# Patient Record
Sex: FEMALE | Race: WHITE | Employment: UNEMPLOYED | URBAN - METROPOLITAN AREA
[De-identification: names, ages, dates, MRNs, and addresses within clinical notes are randomized per-mention and may not be internally consistent; named-entity substitution may affect disease eponyms.]

---

## 2020-02-03 ENCOUNTER — HOSPITAL ENCOUNTER (EMERGENCY)
Age: 30
Discharge: HOME | End: 2020-02-03
Attending: EMERGENCY MEDICINE
Payer: COMMERCIAL

## 2020-02-03 ENCOUNTER — APPOINTMENT (OUTPATIENT)
Dept: CT IMAGING | Age: 30
End: 2020-02-03
Payer: COMMERCIAL

## 2020-02-03 VITALS
TEMPERATURE: 97 F | OXYGEN SATURATION: 100 % | HEART RATE: 77 BPM | SYSTOLIC BLOOD PRESSURE: 110 MMHG | DIASTOLIC BLOOD PRESSURE: 71 MMHG | RESPIRATION RATE: 18 BRPM

## 2020-02-03 PROCEDURE — 6360000002 HC RX W HCPCS: Performed by: EMERGENCY MEDICINE

## 2020-02-03 PROCEDURE — 96372 THER/PROPH/DIAG INJ SC/IM: CPT

## 2020-02-03 PROCEDURE — 99284 EMERGENCY DEPT VISIT MOD MDM: CPT

## 2020-02-03 RX ORDER — DIPHENHYDRAMINE HYDROCHLORIDE 50 MG/ML
12.5 INJECTION INTRAMUSCULAR; INTRAVENOUS ONCE
Status: COMPLETED | OUTPATIENT
Start: 2020-02-03 | End: 2020-02-03

## 2020-02-03 RX ORDER — PROCHLORPERAZINE EDISYLATE 5 MG/ML
10 INJECTION INTRAMUSCULAR; INTRAVENOUS ONCE
Status: COMPLETED | OUTPATIENT
Start: 2020-02-03 | End: 2020-02-03

## 2020-02-03 RX ADMIN — DIPHENHYDRAMINE HYDROCHLORIDE 12.5 MG: 50 INJECTION, SOLUTION INTRAMUSCULAR; INTRAVENOUS at 02:16

## 2020-02-03 RX ADMIN — PROCHLORPERAZINE EDISYLATE 10 MG: 5 INJECTION INTRAMUSCULAR; INTRAVENOUS at 02:16

## 2020-02-03 ASSESSMENT — PAIN DESCRIPTION - ORIENTATION: ORIENTATION: RIGHT;LEFT;POSTERIOR

## 2020-02-03 ASSESSMENT — ENCOUNTER SYMPTOMS
SHORTNESS OF BREATH: 0
ABDOMINAL PAIN: 0
BACK PAIN: 0
COUGH: 0
NAUSEA: 1
VOMITING: 1

## 2020-02-03 ASSESSMENT — PAIN DESCRIPTION - LOCATION: LOCATION: HEAD

## 2020-02-03 ASSESSMENT — PAIN DESCRIPTION - DESCRIPTORS: DESCRIPTORS: HEADACHE

## 2020-02-03 ASSESSMENT — PAIN SCALES - GENERAL: PAINLEVEL_OUTOF10: 9

## 2020-02-03 ASSESSMENT — PAIN DESCRIPTION - FREQUENCY: FREQUENCY: CONTINUOUS

## 2020-02-03 ASSESSMENT — PAIN DESCRIPTION - PAIN TYPE: TYPE: ACUTE PAIN

## 2020-02-03 NOTE — ED PROVIDER NOTES
[facility]     Emergency Department     Faculty Attestation    I performed a history and physical examination of the patient and discussed management with the resident. I have reviewed and agree with the residents findings including all diagnostic interpretations, and treatment plans as written. Any areas of disagreement are noted on the chart. I was personally present for the key portions of any procedures. I have documented in the chart those procedures where I was not present during the key portions. I have reviewed the emergency nurses triage note. I agree with the chief complaint, past medical history, past surgical history, allergies, medications, social and family history as documented unless otherwise noted below. Documentation of the HPI, Physical Exam and Medical Decision Making performed by [email or web address] is based on my personal performance of the HPI, PE and MDM. For Physician Assistant/ Nurse Practitioner cases/documentation I have personally evaluated this patient and have completed at least one if not all key elements of the E/M (history, physical exam, and MDM). Additional findings are as noted. Patient with history of MS. Occasional headaches in the past.  But states she woke up suddenly prior to coming with a 10 out of 10 headache. That woke her up from sleep. She was nauseous with vomiting. States she has never had a headache like this in the past.    Patient has received a migraine cocktail prior to my evaluation and headache has resolved at this time and she is requesting to be discharged. I did speak with patient at length regarding her concerning symptoms for her headache and concern for possible subarachnoid hemorrhage versus ruptured aneurysm. And recommend CT head. Patient states that she understands the concern. But declines further work-up she is of capable decision-making capacity. She is accompanied by her mother.   There is no family history of aneurysms. She would like to be discharged, and knows she can return at any time for further evaluation. She is aware of the risk for possible bleed and and that this could potentially lead to death.     Mg Goldsmith D.O, M.P.H  Attending Emergency Medicine Physician         Mg Goldsmith,   02/03/20 [id number]

## 2020-02-03 NOTE — ED PROVIDER NOTES
[facility]  ED  Emergency Department Encounter  EmergencyMedicine Resident     Pt Name:Yisel Warner  MRN: [id number]  [place] 1990  Date of evaluation: 2/3/20  PCP:  No primary care provider on file. CHIEF COMPLAINT       Chief Complaint   Patient presents with    Migraine     posterior scalp to bilateral temporal areas         HISTORY OF PRESENT ILLNESS  (Location/Symptom, Timing/Onset, Context/Setting, Quality, Duration,Modifying Factors, Severity.)      Alyson Potts is a 34 y.o. female who presents with migraine. Patient states that she woke up earlier this evening with a headache. States that this happened about an hour prior to arrival.  States his headache is worse than her normal migraines. She does not take migraine medication. She tried 800 mg of Motrin which did not improve her headache. Nothing seems to make her headache worse. Associate with photophobia and neck pain nausea and vomiting. Patient also has a past medical history of MS. She is not taking any medications for this. Denies any blurred vision double vision. Headache was of rapid onset. Time of maximal intensity was brief. States this is different from her normal migraines. Denies any trauma, no blood thinners, no IV drug use. Severity is moderate duration is constant context is headache          PAST MEDICAL / SURGICAL / SOCIAL / FAMILY HISTORY      has no past medical history on file. has no past surgical history on file.     Social History     Socioeconomic History    Marital status:      Spouse name: Not on file    Number of children: Not on file    Years of education: Not on file    Highest education level: Not on file   Occupational History    Not on file   Social Needs    Financial resource strain: Not on file    Food insecurity:     Worry: Not on file     Inability: Not on file    Transportation needs:     Medical: Not on file     Non-medical: Not on file   Tobacco Use    Head: Normocephalic and atraumatic. Eyes:      Pupils: Pupils are equal, round, and reactive to light. Neck:      Vascular: No JVD. Trachea: No tracheal deviation. Cardiovascular:      Rate and Rhythm: Normal rate and regular rhythm. Pulmonary:      Effort: Pulmonary effort is normal.      Breath sounds: Normal breath sounds. Skin:     General: Skin is warm and dry. Findings: No rash. Neurological:      Mental Status: She is alert and oriented to person, place, and time. Comments: Cranial nerves II through XII grossly intact bilaterally. Muscle strength 5 out of 5 in upper and lower extremities bilaterally . Sensation intact to face and extremities, cerebellar testing normal including normal finger-nose-finger and heel-to-shin. DTRs 2+. Gait steady without assistance. DIFFERENTIAL  DIAGNOSIS     PLAN (LABS / IMAGING / EKG):  Orders Placed This Encounter   Procedures    CBC Auto Differential    HCG Qualitative, Serum    Protime-INR    APTT    Basic Metabolic Panel       MEDICATIONS ORDERED:  Orders Placed This Encounter   Medications    prochlorperazine (COMPAZINE) injection 10 mg    diphenhydrAMINE (BENADRYL) injection 12.5 mg         SCORES  DIAGNOSTIC RESULTS / EMERGENCY DEPARTMENT COURSE / MDM     LABS:  No results found for this visit on 02/03/20. IMPRESSION: This is a 59-year-old female presenting with migraine with multiple red flag symptoms. On my exam patient has stable vital signs she is neuro intact. No nuchal rigidity but she does have some tenderness at the base of the skull. Impression is headache, rule out subarachnoid hemorrhage. Plan is CT head, basic labs, migraine cocktail. We will proceed with lumbar puncture if CT head negative. Will reevaluate.     RADIOLOGY:  No orders to display         EKG  None    All EKG's are interpreted by the [facility] Physician who either signs or Co-signs this chart in the absence of a

## 2020-05-19 ENCOUNTER — HOSPITAL ENCOUNTER (EMERGENCY)
Age: 30
Discharge: HOME | End: 2020-05-19
Attending: EMERGENCY MEDICINE
Payer: COMMERCIAL

## 2020-05-19 VITALS
HEART RATE: 88 BPM | TEMPERATURE: 98.6 F | RESPIRATION RATE: 18 BRPM | HEIGHT: 66 IN | OXYGEN SATURATION: 99 % | WEIGHT: 110 LBS | DIASTOLIC BLOOD PRESSURE: 77 MMHG | BODY MASS INDEX: 17.68 KG/M2 | SYSTOLIC BLOOD PRESSURE: 111 MMHG

## 2020-05-19 LAB
ABSOLUTE EOS #: 0.07 K/UL (ref 0–0.44)
ABSOLUTE IMMATURE GRANULOCYTE: <0.03 K/UL (ref 0–0.3)
ABSOLUTE LYMPH #: 2.87 K/UL (ref 1.1–3.7)
ABSOLUTE MONO #: 0.57 K/UL (ref 0.1–1.2)
ALBUMIN SERPL-MCNC: 4 G/DL (ref 3.5–5.2)
ALBUMIN/GLOBULIN RATIO: 1.4 (ref 1–2.5)
ALP BLD-CCNC: 60 U/L (ref 35–104)
ALT SERPL-CCNC: 6 U/L (ref 5–33)
ANION GAP SERPL CALCULATED.3IONS-SCNC: 11 MMOL/L (ref 9–17)
AST SERPL-CCNC: 12 U/L
BASOPHILS # BLD: 0 % (ref 0–2)
BASOPHILS ABSOLUTE: 0.04 K/UL (ref 0–0.2)
BILIRUB SERPL-MCNC: 0.18 MG/DL (ref 0.3–1.2)
BUN BLDV-MCNC: 14 MG/DL (ref 6–20)
BUN/CREAT BLD: ABNORMAL (ref 9–20)
CALCIUM SERPL-MCNC: 9.3 MG/DL (ref 8.6–10.4)
CHLORIDE BLD-SCNC: 104 MMOL/L (ref 98–107)
CO2: 23 MMOL/L (ref 20–31)
CREAT SERPL-MCNC: 0.55 MG/DL (ref 0.5–0.9)
DIFFERENTIAL TYPE: ABNORMAL
EOSINOPHILS RELATIVE PERCENT: 1 % (ref 1–4)
GFR AFRICAN AMERICAN: >60 ML/MIN
GFR NON-AFRICAN AMERICAN: >60 ML/MIN
GFR SERPL CREATININE-BSD FRML MDRD: ABNORMAL ML/MIN/{1.73_M2}
GFR SERPL CREATININE-BSD FRML MDRD: ABNORMAL ML/MIN/{1.73_M2}
GLUCOSE BLD-MCNC: 90 MG/DL (ref 70–99)
HCG QUALITATIVE: NEGATIVE
HCT VFR BLD CALC: 34.8 % (ref 36.3–47.1)
HEMOGLOBIN: 10.4 G/DL (ref 11.9–15.1)
IMMATURE GRANULOCYTES: 0 %
LIPASE: 19 U/L (ref 13–60)
LYMPHOCYTES # BLD: 30 % (ref 24–43)
MCH RBC QN AUTO: 21.7 PG (ref 25.2–33.5)
MCHC RBC AUTO-ENTMCNC: 29.9 G/DL (ref 28.4–34.8)
MCV RBC AUTO: 72.7 FL (ref 82.6–102.9)
MONOCYTES # BLD: 6 % (ref 3–12)
NRBC AUTOMATED: 0 PER 100 WBC
PDW BLD-RTO: 18.3 % (ref 11.8–14.4)
PLATELET # BLD: ABNORMAL K/UL (ref 138–453)
PLATELET ESTIMATE: ABNORMAL
PLATELET, FLUORESCENCE: 218 K/UL (ref 138–453)
PLATELET, IMMATURE FRACTION: 8.6 % (ref 1.1–10.3)
PMV BLD AUTO: ABNORMAL FL (ref 8.1–13.5)
POTASSIUM SERPL-SCNC: 4.3 MMOL/L (ref 3.7–5.3)
RBC # BLD: 4.79 M/UL (ref 3.95–5.11)
RBC # BLD: ABNORMAL 10*6/UL
SEG NEUTROPHILS: 63 % (ref 36–65)
SEGMENTED NEUTROPHILS ABSOLUTE COUNT: 6.15 K/UL (ref 1.5–8.1)
SODIUM BLD-SCNC: 138 MMOL/L (ref 135–144)
TOTAL PROTEIN: 6.9 G/DL (ref 6.4–8.3)
WBC # BLD: 9.7 K/UL (ref 3.5–11.3)
WBC # BLD: ABNORMAL 10*3/UL

## 2020-05-19 PROCEDURE — 84703 CHORIONIC GONADOTROPIN ASSAY: CPT

## 2020-05-19 PROCEDURE — 2580000003 HC RX 258: Performed by: STUDENT IN AN ORGANIZED HEALTH CARE EDUCATION/TRAINING PROGRAM

## 2020-05-19 PROCEDURE — 96374 THER/PROPH/DIAG INJ IV PUSH: CPT

## 2020-05-19 PROCEDURE — 83690 ASSAY OF LIPASE: CPT

## 2020-05-19 PROCEDURE — 85025 COMPLETE CBC W/AUTO DIFF WBC: CPT

## 2020-05-19 PROCEDURE — 2500000003 HC RX 250 WO HCPCS: Performed by: STUDENT IN AN ORGANIZED HEALTH CARE EDUCATION/TRAINING PROGRAM

## 2020-05-19 PROCEDURE — 6360000002 HC RX W HCPCS: Performed by: STUDENT IN AN ORGANIZED HEALTH CARE EDUCATION/TRAINING PROGRAM

## 2020-05-19 PROCEDURE — 99283 EMERGENCY DEPT VISIT LOW MDM: CPT

## 2020-05-19 PROCEDURE — 96375 TX/PRO/DX INJ NEW DRUG ADDON: CPT

## 2020-05-19 PROCEDURE — 85055 RETICULATED PLATELET ASSAY: CPT

## 2020-05-19 PROCEDURE — 80053 COMPREHEN METABOLIC PANEL: CPT

## 2020-05-19 RX ORDER — METOCLOPRAMIDE HYDROCHLORIDE 5 MG/ML
10 INJECTION INTRAMUSCULAR; INTRAVENOUS ONCE
Status: COMPLETED | OUTPATIENT
Start: 2020-05-19 | End: 2020-05-19

## 2020-05-19 RX ORDER — FAMOTIDINE 20 MG/1
20 TABLET, FILM COATED ORAL 2 TIMES DAILY
Qty: 20 TABLET | Refills: 0 | Status: SHIPPED | OUTPATIENT
Start: 2020-05-19

## 2020-05-19 RX ORDER — ONDANSETRON 2 MG/ML
4 INJECTION INTRAMUSCULAR; INTRAVENOUS ONCE
Status: COMPLETED | OUTPATIENT
Start: 2020-05-19 | End: 2020-05-19

## 2020-05-19 RX ORDER — 0.9 % SODIUM CHLORIDE 0.9 %
1000 INTRAVENOUS SOLUTION INTRAVENOUS ONCE
Status: COMPLETED | OUTPATIENT
Start: 2020-05-19 | End: 2020-05-19

## 2020-05-19 RX ORDER — ONDANSETRON 4 MG/1
4 TABLET, ORALLY DISINTEGRATING ORAL EVERY 8 HOURS PRN
Qty: 10 TABLET | Refills: 0 | Status: SHIPPED | OUTPATIENT
Start: 2020-05-19

## 2020-05-19 RX ADMIN — ONDANSETRON 4 MG: 2 INJECTION, SOLUTION INTRAMUSCULAR; INTRAVENOUS at 19:16

## 2020-05-19 RX ADMIN — METOCLOPRAMIDE 10 MG: 5 INJECTION, SOLUTION INTRAMUSCULAR; INTRAVENOUS at 19:57

## 2020-05-19 RX ADMIN — SODIUM CHLORIDE 1000 ML: 9 INJECTION, SOLUTION INTRAVENOUS at 19:16

## 2020-05-19 RX ADMIN — FAMOTIDINE 20 MG: 10 INJECTION, SOLUTION INTRAVENOUS at 19:16

## 2020-05-19 ASSESSMENT — ENCOUNTER SYMPTOMS
DIARRHEA: 0
CONSTIPATION: 0
WHEEZING: 0
NAUSEA: 1
VOMITING: 0
SHORTNESS OF BREATH: 0
COUGH: 0
ABDOMINAL PAIN: 1

## 2020-05-19 ASSESSMENT — PAIN DESCRIPTION - DESCRIPTORS: DESCRIPTORS: PATIENT UNABLE TO DESCRIBE

## 2020-05-19 ASSESSMENT — PAIN DESCRIPTION - FREQUENCY: FREQUENCY: CONTINUOUS

## 2020-05-19 ASSESSMENT — PAIN DESCRIPTION - ORIENTATION: ORIENTATION: RIGHT;UPPER

## 2020-05-19 ASSESSMENT — PAIN SCALES - GENERAL: PAINLEVEL_OUTOF10: 8

## 2020-05-19 ASSESSMENT — PAIN DESCRIPTION - LOCATION: LOCATION: ABDOMEN

## 2020-05-19 ASSESSMENT — PAIN DESCRIPTION - PROGRESSION: CLINICAL_PROGRESSION: NOT CHANGED

## 2020-05-19 ASSESSMENT — PAIN DESCRIPTION - PAIN TYPE: TYPE: ACUTE PAIN

## 2020-05-19 NOTE — ED PROVIDER NOTES
Social connections     Talks on phone: Not on file     Gets together: Not on file     Attends [employer] service: Not on file     Active member of club or organization: Not on file     Attends meetings of clubs or organizations: Not on file     Relationship status: Not on file    Intimate partner violence     Fear of current or ex partner: Not on file     Emotionally abused: Not on file     Physically abused: Not on file     Forced sexual activity: Not on file   Other Topics Concern    Not on file   Social History Narrative    Not on file       Patient was advised to stop smoking or to avoid tobacco use    History reviewed. No pertinent family history. Allergies:  Patient has no known allergies. Home Medications:  Prior to Admission medications    Medication Sig Start Date End Date Taking? Authorizing Provider   famotidine (PEPCID) 20 MG tablet Take 1 tablet by mouth 2 times daily 5/19/20  Yes Cher Beckwith, DO   ondansetron (ZOFRAN ODT) 4 MG disintegrating tablet Take 1 tablet by mouth every 8 hours as needed for Nausea 5/19/20  Yes Cher Beckwith, DO       REVIEW OF SYSTEMS    (2-9 systems for level 4, 10 or more for level 5)      Review of Systems   Constitutional: Negative for activity change, appetite change and fever. Respiratory: Negative for cough, shortness of breath and wheezing. Cardiovascular: Negative for chest pain. Gastrointestinal: Positive for abdominal pain and nausea. Negative for constipation, diarrhea and vomiting. Genitourinary: Negative for difficulty urinating and dysuria. Musculoskeletal: Negative for gait problem. Skin: Negative for rash. Neurological: Negative for weakness and headaches. PHYSICAL EXAM   (up to 7 for level 4, 8 or more for level 5)      INITIAL VITALS:  /77   Pulse 88   Temp 98.6 °F (37 °C) (Oral)   Resp 18   Ht 5' 6\" (1.676 m)   Wt 110 lb (49.9 kg)   SpO2 99%   BMI 17.75 kg/m²     Physical Exam  Vitals signs reviewed. Constitutional:       General: She is not in acute distress. Appearance: She is well-developed. She is not diaphoretic. HENT:      Head: Normocephalic and atraumatic. Eyes:      Extraocular Movements: Extraocular movements intact. Pupils: Pupils are equal, round, and reactive to light. Cardiovascular:      Rate and Rhythm: Normal rate and regular rhythm. Heart sounds: Normal heart sounds. Pulmonary:      Effort: Pulmonary effort is normal. No respiratory distress. Breath sounds: Normal breath sounds. No wheezing. Abdominal:      General: Bowel sounds are normal.      Palpations: Abdomen is soft. Tenderness: There is abdominal tenderness (Mild tenderness to palpation right upper quadrant no Martins sign, negative rebound or guarding). There is no rebound. Musculoskeletal: Normal range of motion. Skin:     General: Skin is warm and dry. Neurological:      Mental Status: She is alert and oriented to person, place, and time. Cranial Nerves: No cranial nerve deficit. Psychiatric:         Thought Content:  Thought content normal.       DIFFERENTIAL  DIAGNOSIS     PLAN (LABS / IMAGING / EKG):  Orders Placed This Encounter   Procedures    CBC Auto Differential    Comprehensive Metabolic Panel    Lipase    HCG, SERUM, QUALITATIVE    Immature Platelet Fraction    Insert peripheral IV       MEDICATIONS ORDERED:  Orders Placed This Encounter   Medications    0.9 % sodium chloride bolus    ondansetron (ZOFRAN) injection 4 mg    famotidine (PEPCID) injection 20 mg    metoclopramide (REGLAN) injection 10 mg    famotidine (PEPCID) 20 MG tablet     Sig: Take 1 tablet by mouth 2 times daily     Dispense:  20 tablet     Refill:  0    ondansetron (ZOFRAN ODT) 4 MG disintegrating tablet     Sig: Take 1 tablet by mouth every 8 hours as needed for Nausea     Dispense:  10 tablet     Refill:  0       DIAGNOSTIC RESULTS / DEPARTMENT COURSE / MDM     LABS:  Results for orders placed Result Value Ref Range    Platelet, Immature Fraction 8.6 1.1 - 10.3 %    Platelet, Fluorescence 218 138 - 453 k/uL       IMPRESSION: Patient 82-year-old female who presents with right upper quadrant uzma pain as above. Will obtain CBC CMP lipase urine pregnancy urinalysis give IV fluids Zofran Pepcid and reevaluate patient. Patient requesting discharge. Patient was given written and verbalinstructions prior to discharge. Patient understood and agreed. The patient had no further questions. RADIOLOGY:  None    EKG  None    All EKG's are interpreted by the Emergency Department Physician who either signs or Co-signsthis chart in the absence of a cardiologist.    EMERGENCY DEPARTMENT COURSE:  ED Course as of May 19 2100   Tue May 19, 2020   1954 Lipase: 19 [BL]   1954 Comprehensive Metabolic Panel(!):    Glucose 90   BUN 14   Creatinine 0.55   Bun/Cre Ratio NOT REPORTED   Calcium 9.3   Sodium 138   Potassium 4.3   Chloride 104   CO2 23   Anion Gap 11   Alk Phos 60   ALT 6   AST 12   Bilirubin 0.18(!)   Total Protein 6.9   Albumin 4.0   Albumin/Globulin Ratio 1.4   GFR Non- >60   GFR  >60   GFR Comment        GFR Staging NOT REPORTED [BL]   1957 HCG, SERUM, QUALITATIVE:    hCG Qual NEGATIVE [BL]   1955 CBC Auto Differential(!):    WBC 9.7   RBC 4.79   Hemoglobin Quant 10.4(!)   Hematocrit 34.8(!)   MCV 72.7(!)   MCH 21.7(!)   MCHC 29.9   RDW 18.3(!)   Platelet Count See Reflexed IPF Result   MPV NOT REPORTED   NRBC Automated 0.0   Differential Type NOT REPORTED   WBC Morphology NOT REPORTED   RBC Morphology ANISOCYTOSIS PRESENT   Platelet Estimate NOT REPORTED   Seg Neutrophils 63   Lymphocytes 30   Monocytes 6   Eosinophils % 1   Basophils 0   Immature Granulocytes 0   Segs Absolute 6.15   Absol. .. [BL]   2030 Patient reevaluated was upset that we were checking her urine for UTI she stated that this is not a UTI she has had this pain for \"years she thinks it is her

## 2021-06-02 ENCOUNTER — HOSPITAL ENCOUNTER (EMERGENCY)
Age: 31
Discharge: LEFT BEFORE DISCHARGE | End: 2021-06-02
Payer: MEDICAID

## 2021-06-02 VITALS
OXYGEN SATURATION: 100 % | SYSTOLIC BLOOD PRESSURE: 113 MMHG | TEMPERATURE: 98.4 F | HEART RATE: 89 BPM | DIASTOLIC BLOOD PRESSURE: 79 MMHG | RESPIRATION RATE: 18 BRPM

## 2021-06-02 NOTE — ED TRIAGE NOTES
Pt ambulated to triage with co fever and emesis with 4 occurences in last 24 hours. Pt is currently afebrile. NAD noted at this time. Pt respirations are even and unlabored, pt is oriented X 4, speaking in complete sentences. .  Will continue to monitor.

## 2021-06-24 ENCOUNTER — HOSPITAL ENCOUNTER (OUTPATIENT)
Age: 31
Discharge: HOME | End: 2021-06-24
Payer: MEDICAID

## 2021-06-24 LAB
ABSOLUTE EOS #: 0.16 K/UL (ref 0–0.44)
ABSOLUTE IMMATURE GRANULOCYTE: 0.03 K/UL (ref 0–0.3)
ABSOLUTE LYMPH #: 3.41 K/UL (ref 1.1–3.7)
ABSOLUTE MONO #: 0.59 K/UL (ref 0.1–1.2)
ALBUMIN SERPL-MCNC: 3.5 G/DL (ref 3.5–5.2)
ALBUMIN/GLOBULIN RATIO: 1.5 (ref 1–2.5)
ALP BLD-CCNC: 70 U/L (ref 35–104)
ALT SERPL-CCNC: 8 U/L (ref 5–33)
ANION GAP SERPL CALCULATED.3IONS-SCNC: 7 MMOL/L (ref 9–17)
AST SERPL-CCNC: 13 U/L
BASOPHILS # BLD: 0 % (ref 0–2)
BASOPHILS ABSOLUTE: 0.04 K/UL (ref 0–0.2)
BILIRUB SERPL-MCNC: <0.1 MG/DL (ref 0.3–1.2)
BUN BLDV-MCNC: 10 MG/DL (ref 6–20)
BUN/CREAT BLD: ABNORMAL (ref 9–20)
CALCIUM SERPL-MCNC: 8.6 MG/DL (ref 8.6–10.4)
CHLORIDE BLD-SCNC: 103 MMOL/L (ref 98–107)
CO2: 26 MMOL/L (ref 20–31)
CREAT SERPL-MCNC: 0.46 MG/DL (ref 0.5–0.9)
DIFFERENTIAL TYPE: NORMAL
EOSINOPHILS RELATIVE PERCENT: 2 % (ref 1–4)
GFR AFRICAN AMERICAN: >60 ML/MIN
GFR NON-AFRICAN AMERICAN: >60 ML/MIN
GFR SERPL CREATININE-BSD FRML MDRD: ABNORMAL ML/MIN/{1.73_M2}
GFR SERPL CREATININE-BSD FRML MDRD: ABNORMAL ML/MIN/{1.73_M2}
GLUCOSE BLD-MCNC: 100 MG/DL (ref 70–99)
HAV IGM SER IA-ACNC: NONREACTIVE
HCG QUALITATIVE: NEGATIVE
HCT VFR BLD CALC: 37.8 % (ref 36.3–47.1)
HEMOGLOBIN: 12 G/DL (ref 11.9–15.1)
HEPATITIS B CORE IGM ANTIBODY: NONREACTIVE
HEPATITIS B SURFACE ANTIGEN: NONREACTIVE
HEPATITIS C ANTIBODY: NONREACTIVE
HIV AG/AB: NONREACTIVE
IMMATURE GRANULOCYTES: 0 %
LYMPHOCYTES # BLD: 37 % (ref 24–43)
MCH RBC QN AUTO: 27.1 PG (ref 25.2–33.5)
MCHC RBC AUTO-ENTMCNC: 31.7 G/DL (ref 28.4–34.8)
MCV RBC AUTO: 85.5 FL (ref 82.6–102.9)
MONOCYTES # BLD: 6 % (ref 3–12)
NRBC AUTOMATED: 0 PER 100 WBC
PDW BLD-RTO: 14.4 % (ref 11.8–14.4)
PLATELET # BLD: 327 K/UL (ref 138–453)
PLATELET ESTIMATE: NORMAL
PMV BLD AUTO: 11.2 FL (ref 8.1–13.5)
POTASSIUM SERPL-SCNC: 4.1 MMOL/L (ref 3.7–5.3)
RBC # BLD: 4.42 M/UL (ref 3.95–5.11)
RBC # BLD: NORMAL 10*6/UL
SEG NEUTROPHILS: 55 % (ref 36–65)
SEGMENTED NEUTROPHILS ABSOLUTE COUNT: 5.05 K/UL (ref 1.5–8.1)
SODIUM BLD-SCNC: 136 MMOL/L (ref 135–144)
TOTAL PROTEIN: 5.8 G/DL (ref 6.4–8.3)
WBC # BLD: 9.3 K/UL (ref 3.5–11.3)
WBC # BLD: NORMAL 10*3/UL

## 2021-06-24 PROCEDURE — 87389 HIV-1 AG W/HIV-1&-2 AB AG IA: CPT

## 2021-06-24 PROCEDURE — 80053 COMPREHEN METABOLIC PANEL: CPT

## 2021-06-24 PROCEDURE — 36415 COLL VENOUS BLD VENIPUNCTURE: CPT

## 2021-06-24 PROCEDURE — 84703 CHORIONIC GONADOTROPIN ASSAY: CPT

## 2021-06-24 PROCEDURE — 86480 TB TEST CELL IMMUN MEASURE: CPT

## 2021-06-24 PROCEDURE — 80074 ACUTE HEPATITIS PANEL: CPT

## 2021-06-24 PROCEDURE — 85025 COMPLETE CBC W/AUTO DIFF WBC: CPT

## 2021-06-28 LAB
-: NORMAL
REASON FOR REJECTION: NORMAL
ZZ NTE CLEAN UP: ORDERED TEST: NORMAL
ZZ NTE WITH NAME CLEAN UP: SPECIMEN SOURCE: NORMAL